# Patient Record
Sex: FEMALE | Race: ASIAN | NOT HISPANIC OR LATINO | Employment: OTHER | URBAN - NONMETROPOLITAN AREA
[De-identification: names, ages, dates, MRNs, and addresses within clinical notes are randomized per-mention and may not be internally consistent; named-entity substitution may affect disease eponyms.]

---

## 2023-12-28 PROBLEM — R25.2 LEG CRAMPS: Status: ACTIVE | Noted: 2023-12-28

## 2023-12-28 PROBLEM — R07.9 CHEST PAIN: Status: ACTIVE | Noted: 2023-12-28

## 2023-12-28 PROBLEM — R06.02 SHORTNESS OF BREATH ON EXERTION: Status: ACTIVE | Noted: 2023-12-28

## 2023-12-28 PROBLEM — M71.9 BURSITIS: Status: ACTIVE | Noted: 2023-12-28

## 2023-12-28 PROBLEM — R00.1 BRADYCARDIA: Status: ACTIVE | Noted: 2023-12-28

## 2023-12-28 PROBLEM — I44.30 AV BLOCK: Status: ACTIVE | Noted: 2023-12-28

## 2023-12-28 PROBLEM — D75.839 THROMBOCYTOSIS: Status: ACTIVE | Noted: 2023-12-28

## 2023-12-28 PROBLEM — J45.909 ASTHMA (HHS-HCC): Status: ACTIVE | Noted: 2023-12-28

## 2023-12-28 PROBLEM — I47.10 SUPRAVENTRICULAR TACHYCARDIA (CMS-HCC): Status: ACTIVE | Noted: 2023-12-28

## 2023-12-28 PROBLEM — I10 HYPERTENSION: Status: ACTIVE | Noted: 2023-12-28

## 2023-12-28 PROBLEM — E78.5 HYPERLIPIDEMIA: Status: ACTIVE | Noted: 2023-12-28

## 2023-12-28 PROBLEM — I35.0 NONRHEUMATIC AORTIC VALVE STENOSIS: Status: ACTIVE | Noted: 2023-12-28

## 2023-12-28 RX ORDER — AMLODIPINE BESYLATE 5 MG/1
1.5 TABLET ORAL DAILY
COMMUNITY

## 2023-12-28 RX ORDER — FOLIC ACID 1 MG/1
1 TABLET ORAL DAILY
COMMUNITY

## 2023-12-28 RX ORDER — LANOLIN ALCOHOL/MO/W.PET/CERES
1 CREAM (GRAM) TOPICAL DAILY
COMMUNITY

## 2023-12-28 RX ORDER — CHOLECALCIFEROL (VITAMIN D3) 125 MCG
125 CAPSULE ORAL DAILY
COMMUNITY

## 2023-12-28 RX ORDER — ASPIRIN 81 MG/1
1 TABLET ORAL DAILY
COMMUNITY

## 2023-12-28 RX ORDER — FLECAINIDE ACETATE 50 MG/1
1 TABLET ORAL EVERY 12 HOURS
COMMUNITY
Start: 2021-11-02

## 2023-12-28 RX ORDER — IRBESARTAN 150 MG/1
300 TABLET ORAL DAILY
COMMUNITY

## 2024-01-04 ENCOUNTER — OFFICE VISIT (OUTPATIENT)
Dept: CARDIOLOGY | Facility: CLINIC | Age: 89
End: 2024-01-04
Payer: MEDICARE

## 2024-01-04 VITALS
DIASTOLIC BLOOD PRESSURE: 84 MMHG | HEART RATE: 67 BPM | SYSTOLIC BLOOD PRESSURE: 144 MMHG | HEIGHT: 63 IN | BODY MASS INDEX: 23.57 KG/M2 | WEIGHT: 133 LBS

## 2024-01-04 DIAGNOSIS — R06.02 SHORTNESS OF BREATH ON EXERTION: Primary | ICD-10-CM

## 2024-01-04 DIAGNOSIS — R00.1 BRADYCARDIA: ICD-10-CM

## 2024-01-04 DIAGNOSIS — E78.2 MIXED HYPERLIPIDEMIA: ICD-10-CM

## 2024-01-04 DIAGNOSIS — I44.30 AV BLOCK: ICD-10-CM

## 2024-01-04 DIAGNOSIS — I47.10 SUPRAVENTRICULAR TACHYCARDIA (CMS-HCC): ICD-10-CM

## 2024-01-04 DIAGNOSIS — I10 PRIMARY HYPERTENSION: ICD-10-CM

## 2024-01-04 DIAGNOSIS — I35.0 NONRHEUMATIC AORTIC VALVE STENOSIS: ICD-10-CM

## 2024-01-04 PROCEDURE — 93000 ELECTROCARDIOGRAM COMPLETE: CPT | Performed by: INTERNAL MEDICINE

## 2024-01-04 PROCEDURE — 1036F TOBACCO NON-USER: CPT | Performed by: INTERNAL MEDICINE

## 2024-01-04 PROCEDURE — 99214 OFFICE O/P EST MOD 30 MIN: CPT | Performed by: INTERNAL MEDICINE

## 2024-01-04 PROCEDURE — 3077F SYST BP >= 140 MM HG: CPT | Performed by: INTERNAL MEDICINE

## 2024-01-04 PROCEDURE — 3079F DIAST BP 80-89 MM HG: CPT | Performed by: INTERNAL MEDICINE

## 2024-01-04 PROCEDURE — 1159F MED LIST DOCD IN RCRD: CPT | Performed by: INTERNAL MEDICINE

## 2024-01-04 RX ORDER — ROSUVASTATIN CALCIUM 5 MG/1
5 TABLET, COATED ORAL DAILY
Qty: 30 TABLET | Refills: 11 | Status: SHIPPED | OUTPATIENT
Start: 2024-01-04 | End: 2024-01-09 | Stop reason: SDUPTHER

## 2024-01-04 NOTE — PROGRESS NOTES
Subjective   Yancy Chau is a 89 y.o. female       Chief Complaint    Follow-up          HPI   Patient is here for follow-up continue management for history of SVT, tendency for bradycardia, aortic stenosis and because of recent evaluation for fatigue, tiredness increasing shortness of breath.  Last time I saw her I suggested to her to hold her simvastatin and I also did a pulmonary function test that showed mild to moderate diffusion abnormality.  Chest x-ray showed minimal abnormality.  The result of her diagnostic testing reviewed with her family physician Dr. Whatley over the phone.  Patient reports marked improvement since she held her simvastatin.  Her recent lab noted and reviewed with her her LDL is around 131.  Patient currently seems to be doing well.  She remains reasonably active.  She described functional class I.  She report her fatigue and tiredness has improved.  Her shortness of breath also appears to have improved.  Assessment    1. Symptoms of fatigue, tiredness and shortness of breath etiology unclear.  Recent echocardiogram showed moderate aortic stenosis with preserved LV systolic function.  Holter monitor failed to demonstrate significant arrhythmia.  Symptoms seem to be markedly improved with holding simvastatin.  Pulmonary function test showed mild to moderate diffusion abnormality.    2. Prior treatment for supraventricular tachycardia no recurrence in many years  3. Hypertension controlled  4. Hyperlipidemia LDL of 131.  She is off simvastatin  5. moderate aortic stenosis based on recent echocardiogram with recent echo noted and reviewed with him  6. Prior complaint of chest pain. Resolved. Recent stress test negative  7. Mild to moderate mitral regurgitation  8. Previous documentation of bradycardia arrhythmia improved with reducing the dose of flecainide  9.. Thrombocytosis has been followed by hematology  10. Leg cramps improved with adjustment of her potassium and the addition of  "magnesium      Plan    1. I reviewed her chest x-ray and pulmonary function test  2.  It seems like patient's symptoms has improved with holding simvastatin.  Considering she had aortic stenosis and today due to progression of her aortic valve disease I suggested trial of low-dose rosuvastatin 5 mg daily  3. I reviewed the results of her chest x-ray and pulmonary function test with family physician recently over the phone  4. We'll see her back in the office in 6 months and I advised her to notify me with changes in her functional status and symptoms   5.  Patient was asked me if she can travel to [place] and I told her cardiac status has been stable and I have no objection to that  Review of Systems   All other systems reviewed and are negative.         Visit Vitals  /84 (BP Location: Right arm, Patient Position: Sitting)   Pulse 67   Ht 1.588 m (5' 2.5\")   Wt 60.3 kg (133 lb)   BMI 23.94 kg/m²   Smoking Status Never   BSA 1.63 m²      EKG done in office today      Objective   Physical Exam  Constitutional:       Appearance: Normal appearance. She is normal weight.   HENT:      Nose: Nose normal.   Neck:      Vascular: No carotid bruit.   Cardiovascular:      Rate and Rhythm: Normal rate.      Pulses: Normal pulses.      Heart sounds: Murmur (2/6 systolic) heard.   Pulmonary:      Effort: Pulmonary effort is normal.   Abdominal:      General: Bowel sounds are normal.      Palpations: Abdomen is soft.   Genitourinary:     Rectum: Normal.   Musculoskeletal:         General: Normal range of motion.      Cervical back: Normal range of motion.      Right lower leg: No edema.      Left lower leg: No edema.   Skin:     General: Skin is warm and dry.   Neurological:      General: No focal deficit present.      Mental Status: She is alert.   Psychiatric:         Mood and Affect: Mood normal.         Behavior: Behavior normal.         Thought Content: Thought content normal.         Judgment: Judgment normal. "         Current Medications    Current Outpatient Medications:     amLODIPine (Norvasc) 5 mg tablet, Take 1.5 tablets (7.5 mg) by mouth once daily., Disp: , Rfl:     aspirin 81 mg EC tablet, Take 1 tablet (81 mg) by mouth once daily., Disp: , Rfl:     cholecalciferol (Vitamin D-3) 125 MCG (5000 UT) capsule, Take 1 capsule (125 mcg) by mouth once daily., Disp: , Rfl:     cyanocobalamin (Vitamin B-12) 1,000 mcg tablet, Take 1 tablet (1,000 mcg) by mouth once daily., Disp: , Rfl:     flecainide (Tambocor) 50 mg tablet, Take 1 tablet (50 mg) by mouth every 12 hours., Disp: , Rfl:     folic acid (Folvite) 1 mg tablet, Take 1 tablet (1 mg) by mouth once daily., Disp: , Rfl:     irbesartan (Avapro) 150 mg tablet, Take 2 tablets (300 mg) by mouth once daily., Disp: , Rfl:     rosuvastatin (Crestor) 5 mg tablet, Take 1 tablet (5 mg) by mouth once daily., Disp: 30 tablet, Rfl: 11                     Assessment/Plan   1. Shortness of breath on exertion        2. AV block        3. Bradycardia        4. Mixed hyperlipidemia  rosuvastatin (Crestor) 5 mg tablet    Lipid Panel    Alanine Aminotransferase    Aspartate Aminotransferase    Lipid Panel    Alanine Aminotransferase    Aspartate Aminotransferase      5. Primary hypertension  Basic Metabolic Panel    Basic Metabolic Panel      6. Nonrheumatic aortic valve stenosis  rosuvastatin (Crestor) 5 mg tablet    Follow Up In Cardiology      7. Supraventricular tachycardia           Scribe Attestation  By signing my name below, Kym VU LPN   , Scribe   attest that this documentation has been prepared under the direction and in the presence of Ra Harrell MD.

## 2024-01-04 NOTE — LETTER
January 4, 2024     Raúl Dahl DO  [address]  [place] [address]    Patient: Yancy Chau   YOB: 1934   Date of Visit: 1/4/2024       Dear Dr. Raúl Dahl DO:    Thank you for referring Yancy Chau to me for evaluation. Below are my notes for this consultation.  If you have questions, please do not hesitate to call me. I look forward to following your patient along with you.       Sincerely,     Ra Harrell MD      CC: No Recipients  ______________________________________________________________________________________    Subjective   Yancy Chau is a 89 y.o. female       Chief Complaint    Follow-up          HPI   Patient is here for follow-up continue management for history of SVT, tendency for bradycardia, aortic stenosis and because of recent evaluation for fatigue, tiredness increasing shortness of breath.  Last time I saw her I suggested to her to hold her simvastatin and I also did a pulmonary function test that showed mild to moderate diffusion abnormality.  Chest x-ray showed minimal abnormality.  The result of her diagnostic testing reviewed with her family physician Dr. Whatley over the phone.  Patient reports marked improvement since she held her simvastatin.  Her recent lab noted and reviewed with her her LDL is around 131.  Patient currently seems to be doing well.  She remains reasonably active.  She described functional class I.  She report her fatigue and tiredness has improved.  Her shortness of breath also appears to have improved.  Assessment    1. Symptoms of fatigue, tiredness and shortness of breath etiology unclear.  Recent echocardiogram showed moderate aortic stenosis with preserved LV systolic function.  Holter monitor failed to demonstrate significant arrhythmia.  Symptoms seem to be markedly improved with holding simvastatin.  Pulmonary function test showed mild to moderate diffusion abnormality.    2. Prior treatment for  "supraventricular tachycardia no recurrence in many years  3. Hypertension controlled  4. Hyperlipidemia LDL of 131.  She is off simvastatin  5. moderate aortic stenosis based on recent echocardiogram with recent echo noted and reviewed with him  6. Prior complaint of chest pain. Resolved. Recent stress test negative  7. Mild to moderate mitral regurgitation  8. Previous documentation of bradycardia arrhythmia improved with reducing the dose of flecainide  9.. Thrombocytosis has been followed by hematology  10. Leg cramps improved with adjustment of her potassium and the addition of magnesium      Plan    1. I reviewed her chest x-ray and pulmonary function test  2.  It seems like patient's symptoms has improved with holding simvastatin.  Considering she had aortic stenosis and today due to progression of her aortic valve disease I suggested trial of low-dose rosuvastatin 5 mg daily  3. I reviewed the results of her chest x-ray and pulmonary function test with family physician recently over the phone  4. We'll see her back in the office in 6 months and I advised her to notify me with changes in her functional status and symptoms   5.  Patient was asked me if she can travel to [place] and I told her cardiac status has been stable and I have no objection to that  Review of Systems   All other systems reviewed and are negative.         Visit Vitals  /84 (BP Location: Right arm, Patient Position: Sitting)   Pulse 67   Ht 1.588 m (5' 2.5\")   Wt 60.3 kg (133 lb)   BMI 23.94 kg/m²   Smoking Status Never   BSA 1.63 m²      EKG done in office today      Objective   Physical Exam  Constitutional:       Appearance: Normal appearance. She is normal weight.   HENT:      Nose: Nose normal.   Neck:      Vascular: No carotid bruit.   Cardiovascular:      Rate and Rhythm: Normal rate.      Pulses: Normal pulses.      Heart sounds: Murmur (2/6 systolic) heard.   Pulmonary:      Effort: Pulmonary effort is normal.   Abdominal:      " General: Bowel sounds are normal.      Palpations: Abdomen is soft.   Genitourinary:     Rectum: Normal.   Musculoskeletal:         General: Normal range of motion.      Cervical back: Normal range of motion.      Right lower leg: No edema.      Left lower leg: No edema.   Skin:     General: Skin is warm and dry.   Neurological:      General: No focal deficit present.      Mental Status: She is alert.   Psychiatric:         Mood and Affect: Mood normal.         Behavior: Behavior normal.         Thought Content: Thought content normal.         Judgment: Judgment normal.         Current Medications    Current Outpatient Medications:   •  amLODIPine (Norvasc) 5 mg tablet, Take 1.5 tablets (7.5 mg) by mouth once daily., Disp: , Rfl:   •  aspirin 81 mg EC tablet, Take 1 tablet (81 mg) by mouth once daily., Disp: , Rfl:   •  cholecalciferol (Vitamin D-3) 125 MCG (5000 UT) capsule, Take 1 capsule (125 mcg) by mouth once daily., Disp: , Rfl:   •  cyanocobalamin (Vitamin B-12) 1,000 mcg tablet, Take 1 tablet (1,000 mcg) by mouth once daily., Disp: , Rfl:   •  flecainide (Tambocor) 50 mg tablet, Take 1 tablet (50 mg) by mouth every 12 hours., Disp: , Rfl:   •  folic acid (Folvite) 1 mg tablet, Take 1 tablet (1 mg) by mouth once daily., Disp: , Rfl:   •  irbesartan (Avapro) 150 mg tablet, Take 2 tablets (300 mg) by mouth once daily., Disp: , Rfl:   •  rosuvastatin (Crestor) 5 mg tablet, Take 1 tablet (5 mg) by mouth once daily., Disp: 30 tablet, Rfl: 11                     Assessment/Plan   1. Shortness of breath on exertion        2. AV block        3. Bradycardia        4. Mixed hyperlipidemia  rosuvastatin (Crestor) 5 mg tablet    Lipid Panel    Alanine Aminotransferase    Aspartate Aminotransferase    Lipid Panel    Alanine Aminotransferase    Aspartate Aminotransferase      5. Primary hypertension  Basic Metabolic Panel    Basic Metabolic Panel      6. Nonrheumatic aortic valve stenosis  rosuvastatin (Crestor) 5 mg  tablet    Follow Up In Cardiology      7. Supraventricular tachycardia           Scribe Attestation  By signing my name below, IKym LPN   , Scribe   attest that this documentation has been prepared under the direction and in the presence of Ra Harrell MD.

## 2024-01-05 DIAGNOSIS — I35.0 NONRHEUMATIC AORTIC VALVE STENOSIS: ICD-10-CM

## 2024-01-05 DIAGNOSIS — E78.2 MIXED HYPERLIPIDEMIA: ICD-10-CM

## 2024-01-09 RX ORDER — ROSUVASTATIN CALCIUM 5 MG/1
5 TABLET, COATED ORAL DAILY
Qty: 90 TABLET | Refills: 3 | Status: SHIPPED | OUTPATIENT
Start: 2024-01-09

## 2024-07-10 ENCOUNTER — APPOINTMENT (OUTPATIENT)
Dept: CARDIOLOGY | Facility: CLINIC | Age: 89
End: 2024-07-10
Payer: MEDICARE

## 2024-07-18 ENCOUNTER — APPOINTMENT (OUTPATIENT)
Dept: CARDIOLOGY | Facility: CLINIC | Age: 89
End: 2024-07-18
Payer: MEDICARE

## 2024-07-18 VITALS
HEART RATE: 70 BPM | DIASTOLIC BLOOD PRESSURE: 80 MMHG | SYSTOLIC BLOOD PRESSURE: 124 MMHG | BODY MASS INDEX: 23.7 KG/M2 | HEIGHT: 62 IN | WEIGHT: 128.8 LBS

## 2024-07-18 DIAGNOSIS — Z78.9 NEVER SMOKED CIGARETTES: ICD-10-CM

## 2024-07-18 DIAGNOSIS — E78.2 MIXED HYPERLIPIDEMIA: ICD-10-CM

## 2024-07-18 DIAGNOSIS — R07.9 CHEST PAIN, UNSPECIFIED TYPE: ICD-10-CM

## 2024-07-18 DIAGNOSIS — I10 PRIMARY HYPERTENSION: ICD-10-CM

## 2024-07-18 DIAGNOSIS — I35.0 NONRHEUMATIC AORTIC VALVE STENOSIS: ICD-10-CM

## 2024-07-18 DIAGNOSIS — R06.02 SHORTNESS OF BREATH ON EXERTION: Primary | ICD-10-CM

## 2024-07-18 DIAGNOSIS — R00.1 BRADYCARDIA: ICD-10-CM

## 2024-07-18 DIAGNOSIS — I47.10 SUPRAVENTRICULAR TACHYCARDIA (CMS-HCC): ICD-10-CM

## 2024-07-18 PROCEDURE — 99214 OFFICE O/P EST MOD 30 MIN: CPT | Performed by: INTERNAL MEDICINE

## 2024-07-18 PROCEDURE — 3074F SYST BP LT 130 MM HG: CPT | Performed by: INTERNAL MEDICINE

## 2024-07-18 PROCEDURE — 1160F RVW MEDS BY RX/DR IN RCRD: CPT | Performed by: INTERNAL MEDICINE

## 2024-07-18 PROCEDURE — 1036F TOBACCO NON-USER: CPT | Performed by: INTERNAL MEDICINE

## 2024-07-18 PROCEDURE — 93000 ELECTROCARDIOGRAM COMPLETE: CPT | Performed by: INTERNAL MEDICINE

## 2024-07-18 PROCEDURE — 3079F DIAST BP 80-89 MM HG: CPT | Performed by: INTERNAL MEDICINE

## 2024-07-18 PROCEDURE — 1159F MED LIST DOCD IN RCRD: CPT | Performed by: INTERNAL MEDICINE

## 2024-07-18 RX ORDER — ROSUVASTATIN CALCIUM 5 MG/1
5 TABLET, COATED ORAL DAILY
COMMUNITY

## 2024-07-18 RX ORDER — SIMVASTATIN 10 MG/1
10 TABLET, FILM COATED ORAL EVERY OTHER DAY
COMMUNITY
End: 2024-07-18 | Stop reason: WASHOUT

## 2024-07-18 ASSESSMENT — ENCOUNTER SYMPTOMS: SHORTNESS OF BREATH: 1

## 2024-07-18 NOTE — LETTER
July 18, 2024     Raúl Dahl DO  [address]  [place] [address]    Patient: Yancy Chau   YOB: 1934   Date of Visit: 7/18/2024       Dear Dr. Raúl Dahl DO:    Thank you for referring Yancy Chau to me for evaluation. Below are my notes for this consultation.  If you have questions, please do not hesitate to call me. I look forward to following your patient along with you.       Sincerely,     Ra Harrell MD      CC: No Recipients  ______________________________________________________________________________________    Subjective   Yancy Chau is a 89 y.o. female       Chief Complaint    Follow-up          HPI        Patient is here for follow-up continue management for history of SVT, previous evaluation fatigue and tiredness, aortic stenosis.  Since last time I saw her check she reports is feeling quite well.  She denies complaint of chest pain, palpitation, lightheadedness, dizziness or syncope.  She indicated to me that she traveled to [place] during the winter and did very well.  She described functional class I.  She had no active cardiac complaint.  Her EKG showed sinus rhythm.    Assessment     1.  Previous complaint of fatigue, tiredness and shortness of breath etiology unclear.  Recent echocardiogram showed moderate aortic stenosis with preserved LV systolic function.  Holter monitor failed to demonstrate significant arrhythmia.  Symptoms seem to be markedly improved with holding simvastatin.  Pulmonary function test showed mild to moderate diffusion abnormality.  But overall she report her symptoms markedly improved.  She has been able to tolerate rosuvastatin  2. Prior treatment for supraventricular tachycardia no recurrence in many years  3. Hypertension controlled  4. Hyperlipidemia able to tolerate rosuvastatin better than simvastatin  5. moderate aortic stenosis based on recent echocardiogram with recent echo noted and reviewed with  "him  6. Prior complaint of chest pain. Resolved. Recent stress test negative  7. Mild to moderate mitral regurgitation  8. Previous documentation of bradycardia arrhythmia improved with reducing the dose of flecainide  9.. Thrombocytosis has been followed by hematology  10. Leg cramps improved with adjustment of her potassium and the addition of magnesium        Plan     1. I advised the patient to continue present medical regimen  2.  I reviewed with her her recent lab work  3. I advised her to notify of change in cardiac status or symptoms  4. We'll see her back in the office in 6 months and I advised her to notify me with changes in her functional status and symptoms     Review of Systems   Respiratory:  Positive for shortness of breath.    All other systems reviewed and are negative.           Vitals:    07/18/24 1317   BP: 124/80   BP Location: Left arm   Patient Position: Sitting   Pulse: 70   Weight: 58.4 kg (128 lb 12.8 oz)   Height: 1.575 m (5' 2\")    EKG done in office today      Objective   Physical Exam  Constitutional:       Appearance: Normal appearance.   HENT:      Nose: Nose normal.   Neck:      Vascular: No carotid bruit.   Cardiovascular:      Rate and Rhythm: Normal rate.      Pulses: Normal pulses.      Heart sounds: Murmur heard.      Systolic murmur is present with a grade of 2/6.   Pulmonary:      Effort: Pulmonary effort is normal.   Abdominal:      General: Bowel sounds are normal.      Palpations: Abdomen is soft.   Musculoskeletal:         General: Normal range of motion.      Cervical back: Normal range of motion.      Right lower leg: No edema.      Left lower leg: No edema.   Skin:     General: Skin is warm and dry.   Neurological:      General: No focal deficit present.      Mental Status: She is alert.   Psychiatric:         Mood and Affect: Mood normal.         Behavior: Behavior normal.         Thought Content: Thought content normal.         Judgment: Judgment normal. "         Allergies  Simvastatin     Current Medications    Current Outpatient Medications:   •  amLODIPine (Norvasc) 5 mg tablet, Take 1.5 tablets (7.5 mg) by mouth once daily., Disp: , Rfl:   •  aspirin 81 mg EC tablet, Take 1 tablet (81 mg) by mouth once daily., Disp: , Rfl:   •  cholecalciferol (Vitamin D-3) 125 MCG (5000 UT) capsule, Take 1 capsule (125 mcg) by mouth once daily., Disp: , Rfl:   •  cyanocobalamin (Vitamin B-12) 1,000 mcg tablet, Take 1 tablet (1,000 mcg) by mouth once daily., Disp: , Rfl:   •  flecainide (Tambocor) 50 mg tablet, Take 1 tablet (50 mg) by mouth every 12 hours., Disp: , Rfl:   •  irbesartan (Avapro) 150 mg tablet, Take 2 tablets (300 mg) by mouth once daily., Disp: , Rfl:   •  rosuvastatin (Crestor) 5 mg tablet, Take 1 tablet (5 mg) by mouth once daily., Disp: , Rfl:                      Assessment/Plan   1. Shortness of breath on exertion        2. Nonrheumatic aortic valve stenosis  Follow Up In Cardiology    Follow Up In Cardiology    ECG 12 Lead      3. Supraventricular tachycardia (CMS-HCC)        4. Bradycardia        5. Chest pain, unspecified type        6. Mixed hyperlipidemia        7. Primary hypertension        8. BMI 23.0-23.9, adult        9. Never smoked cigarettes                 Scribe Attestation  By signing my name below, I, Sheree ODEN LPN , Neibe   attest that this documentation has been prepared under the direction and in the presence of Ra Harrell MD.     Provider Attestation - Scribe documentation    All medical record entries made by the Scribe were at my direction and personally dictated by me. I have reviewed the chart and agree that the record accurately reflects my personal performance of the history, physical exam, discussion and plan.

## 2024-07-18 NOTE — PROGRESS NOTES
Subjective   Yancy Chau is a 89 y.o. female       Chief Complaint    Follow-up          HPI        Patient is here for follow-up continue management for history of SVT, previous evaluation fatigue and tiredness, aortic stenosis.  Since last time I saw her check she reports is feeling quite well.  She denies complaint of chest pain, palpitation, lightheadedness, dizziness or syncope.  She indicated to me that she traveled to [place] during the winter and did very well.  She described functional class I.  She had no active cardiac complaint.  Her EKG showed sinus rhythm.    Assessment     1.  Previous complaint of fatigue, tiredness and shortness of breath etiology unclear.  Recent echocardiogram showed moderate aortic stenosis with preserved LV systolic function.  Holter monitor failed to demonstrate significant arrhythmia.  Symptoms seem to be markedly improved with holding simvastatin.  Pulmonary function test showed mild to moderate diffusion abnormality.  But overall she report her symptoms markedly improved.  She has been able to tolerate rosuvastatin  2. Prior treatment for supraventricular tachycardia no recurrence in many years  3. Hypertension controlled  4. Hyperlipidemia able to tolerate rosuvastatin better than simvastatin  5. moderate aortic stenosis based on recent echocardiogram with recent echo noted and reviewed with him  6. Prior complaint of chest pain. Resolved. Recent stress test negative  7. Mild to moderate mitral regurgitation  8. Previous documentation of bradycardia arrhythmia improved with reducing the dose of flecainide  9.. Thrombocytosis has been followed by hematology  10. Leg cramps improved with adjustment of her potassium and the addition of magnesium        Plan     1. I advised the patient to continue present medical regimen  2.  I reviewed with her her recent lab work  3. I advised her to notify of change in cardiac status or symptoms  4. We'll see her back in the office in 6  "months and I advised her to notify me with changes in her functional status and symptoms     Review of Systems   Respiratory:  Positive for shortness of breath.    All other systems reviewed and are negative.           Vitals:    07/18/24 1317   BP: 124/80   BP Location: Left arm   Patient Position: Sitting   Pulse: 70   Weight: 58.4 kg (128 lb 12.8 oz)   Height: 1.575 m (5' 2\")    EKG done in office today      Objective   Physical Exam  Constitutional:       Appearance: Normal appearance.   HENT:      Nose: Nose normal.   Neck:      Vascular: No carotid bruit.   Cardiovascular:      Rate and Rhythm: Normal rate.      Pulses: Normal pulses.      Heart sounds: Murmur heard.      Systolic murmur is present with a grade of 2/6.   Pulmonary:      Effort: Pulmonary effort is normal.   Abdominal:      General: Bowel sounds are normal.      Palpations: Abdomen is soft.   Musculoskeletal:         General: Normal range of motion.      Cervical back: Normal range of motion.      Right lower leg: No edema.      Left lower leg: No edema.   Skin:     General: Skin is warm and dry.   Neurological:      General: No focal deficit present.      Mental Status: She is alert.   Psychiatric:         Mood and Affect: Mood normal.         Behavior: Behavior normal.         Thought Content: Thought content normal.         Judgment: Judgment normal.         Allergies  Simvastatin     Current Medications    Current Outpatient Medications:     amLODIPine (Norvasc) 5 mg tablet, Take 1.5 tablets (7.5 mg) by mouth once daily., Disp: , Rfl:     aspirin 81 mg EC tablet, Take 1 tablet (81 mg) by mouth once daily., Disp: , Rfl:     cholecalciferol (Vitamin D-3) 125 MCG (5000 UT) capsule, Take 1 capsule (125 mcg) by mouth once daily., Disp: , Rfl:     cyanocobalamin (Vitamin B-12) 1,000 mcg tablet, Take 1 tablet (1,000 mcg) by mouth once daily., Disp: , Rfl:     flecainide (Tambocor) 50 mg tablet, Take 1 tablet (50 mg) by mouth every 12 hours., Disp: " , Rfl:     irbesartan (Avapro) 150 mg tablet, Take 2 tablets (300 mg) by mouth once daily., Disp: , Rfl:     rosuvastatin (Crestor) 5 mg tablet, Take 1 tablet (5 mg) by mouth once daily., Disp: , Rfl:                      Assessment/Plan   1. Shortness of breath on exertion        2. Nonrheumatic aortic valve stenosis  Follow Up In Cardiology    Follow Up In Cardiology    ECG 12 Lead      3. Supraventricular tachycardia (CMS-HCC)        4. Bradycardia        5. Chest pain, unspecified type        6. Mixed hyperlipidemia        7. Primary hypertension        8. BMI 23.0-23.9, adult        9. Never smoked cigarettes                 Scribe Attestation  By signing my name below, I, Skyla Mckeon LPN   attest that this documentation has been prepared under the direction and in the presence of Ra Harrell MD.     Provider Attestation - Scribe documentation    All medical record entries made by the Scribe were at my direction and personally dictated by me. I have reviewed the chart and agree that the record accurately reflects my personal performance of the history, physical exam, discussion and plan.

## 2025-01-30 ENCOUNTER — APPOINTMENT (OUTPATIENT)
Dept: CARDIOLOGY | Facility: CLINIC | Age: OVER 89
End: 2025-01-30
Payer: MEDICARE

## 2025-01-30 VITALS
WEIGHT: 134 LBS | DIASTOLIC BLOOD PRESSURE: 88 MMHG | BODY MASS INDEX: 24.66 KG/M2 | HEART RATE: 68 BPM | HEIGHT: 62 IN | SYSTOLIC BLOOD PRESSURE: 137 MMHG

## 2025-01-30 DIAGNOSIS — I35.0 NONRHEUMATIC AORTIC VALVE STENOSIS: ICD-10-CM

## 2025-01-30 DIAGNOSIS — R06.02 SHORTNESS OF BREATH ON EXERTION: Primary | ICD-10-CM

## 2025-01-30 DIAGNOSIS — I10 PRIMARY HYPERTENSION: ICD-10-CM

## 2025-01-30 DIAGNOSIS — I44.30 AV BLOCK: ICD-10-CM

## 2025-01-30 DIAGNOSIS — E78.2 MIXED HYPERLIPIDEMIA: ICD-10-CM

## 2025-01-30 DIAGNOSIS — R00.1 BRADYCARDIA: ICD-10-CM

## 2025-01-30 DIAGNOSIS — Z78.9 NEVER SMOKED CIGARETTES: ICD-10-CM

## 2025-01-30 DIAGNOSIS — I47.10 SUPRAVENTRICULAR TACHYCARDIA (CMS-HCC): ICD-10-CM

## 2025-01-30 PROCEDURE — 99214 OFFICE O/P EST MOD 30 MIN: CPT | Performed by: INTERNAL MEDICINE

## 2025-01-30 PROCEDURE — 3075F SYST BP GE 130 - 139MM HG: CPT | Performed by: INTERNAL MEDICINE

## 2025-01-30 PROCEDURE — 1159F MED LIST DOCD IN RCRD: CPT | Performed by: INTERNAL MEDICINE

## 2025-01-30 PROCEDURE — 3079F DIAST BP 80-89 MM HG: CPT | Performed by: INTERNAL MEDICINE

## 2025-01-30 PROCEDURE — 1036F TOBACCO NON-USER: CPT | Performed by: INTERNAL MEDICINE

## 2025-01-30 PROCEDURE — 93000 ELECTROCARDIOGRAM COMPLETE: CPT | Performed by: INTERNAL MEDICINE

## 2025-01-30 PROCEDURE — 3077F SYST BP >= 140 MM HG: CPT | Performed by: INTERNAL MEDICINE

## 2025-01-30 PROCEDURE — 1160F RVW MEDS BY RX/DR IN RCRD: CPT | Performed by: INTERNAL MEDICINE

## 2025-01-30 NOTE — LETTER
January 30, 2025     Raúl Dahl DO  [address]  [place] [address]    Patient: Yancy Chau   YOB: 1934   Date of Visit: 1/30/2025       Dear Dr. Raúl Dahl DO:    Thank you for referring Yancy Chau to me for evaluation. Below are my notes for this consultation.  If you have questions, please do not hesitate to call me. I look forward to following your patient along with you.       Sincerely,     Ra Harrell MD      CC: No Recipients  ______________________________________________________________________________________    Subjective   Yancy Chau is a 89 y.o. female       Chief Complaint    Follow-up          HPI        Patient is here for follow-up for management for previous evaluation for fatigue tiredness and shortness of breath which she report marked improvement, hypertension, hyperlipidemia and aortic stenosis.  Since last time I saw her she remains reasonably active.  She is 89-year-old.  She report improvement of her symptoms.  She denies lightheadedness, dizziness or syncope.  She remains fairly active.    Assessment     1.  Previous complaint of fatigue, tiredness and shortness of breath etiology unclear.  Recent echocardiogram showed moderate aortic stenosis with preserved LV systolic function.  Holter monitor failed to demonstrate significant arrhythmia.  Symptoms seem to be markedly improved with holding simvastatin.  Pulmonary function test showed mild to moderate diffusion abnormality.  But overall she report her symptoms markedly improved.  She has been able to tolerate rosuvastatin  2. Prior treatment for supraventricular tachycardia no recurrence in many years  3. Hypertension controlled  4. Hyperlipidemia able to tolerate rosuvastatin better than simvastatin  5. moderate aortic stenosis based on last echocardiogram with recent echo noted and reviewed with him  6. Prior complaint of chest pain. Resolved.  Previous stress test  "negative  7. Mild to moderate mitral regurgitation  8. Previous documentation of bradycardia arrhythmia improved with reducing the dose of flecainide  9.. Thrombocytosis has been followed by hematology  10. Leg cramps improved with adjustment of her potassium and the addition of magnesium        Plan     1. I advised the patient to continue present medical regimen  2.  I reviewed with her her recent lab work  3. I advised her to notify of change in cardiac status or symptoms  4. We'll see her back in the office in 6 months and I advised her to notify me with changes in her functional status and symptoms  5.  Will repeat her echocardiogram prior to next office visit     Review of Systems   All other systems reviewed and are negative.           Vitals:    01/30/25 1327   BP: 154/80   BP Location: Left arm   Patient Position: Sitting   Pulse: 68   Weight: 60.8 kg (134 lb)   Height: 1.575 m (5' 2\")      EKG done in office today      Objective   Physical Exam  Constitutional:       Appearance: Normal appearance.   HENT:      Nose: Nose normal.   Neck:      Vascular: No carotid bruit.   Cardiovascular:      Rate and Rhythm: Normal rate.      Pulses: Normal pulses.      Heart sounds: Normal heart sounds.   Pulmonary:      Effort: Pulmonary effort is normal.   Abdominal:      General: Bowel sounds are normal.      Palpations: Abdomen is soft.   Musculoskeletal:         General: Normal range of motion.      Cervical back: Normal range of motion.      Right lower leg: No edema.      Left lower leg: No edema.   Skin:     General: Skin is warm and dry.   Neurological:      General: No focal deficit present.      Mental Status: She is alert.   Psychiatric:         Mood and Affect: Mood normal.         Behavior: Behavior normal.         Thought Content: Thought content normal.         Judgment: Judgment normal.         Allergies  Simvastatin     Current Medications    Current Outpatient Medications:   •  amLODIPine (Norvasc) 5 mg " tablet, Take 1.5 tablets (7.5 mg) by mouth once daily., Disp: , Rfl:   •  aspirin 81 mg EC tablet, Take 1 tablet (81 mg) by mouth once daily., Disp: , Rfl:   •  cholecalciferol (Vitamin D-3) 125 MCG (5000 UT) capsule, Take 1 capsule (125 mcg) by mouth once daily., Disp: , Rfl:   •  cyanocobalamin (Vitamin B-12) 1,000 mcg tablet, Take 1 tablet (1,000 mcg) by mouth once daily., Disp: , Rfl:   •  flecainide (Tambocor) 50 mg tablet, Take 1 tablet (50 mg) by mouth every 12 hours., Disp: , Rfl:   •  irbesartan (Avapro) 150 mg tablet, Take 2 tablets (300 mg) by mouth once daily., Disp: , Rfl:   •  rosuvastatin (Crestor) 5 mg tablet, Take 1 tablet (5 mg) by mouth once daily., Disp: , Rfl:                      Assessment/Plan   1. Shortness of breath on exertion        2. Nonrheumatic aortic valve stenosis  Follow Up In Cardiology    Follow Up In Cardiology    Transthoracic Echo Complete      3. Supraventricular tachycardia (CMS-HCC)        4. Primary hypertension  Transthoracic Echo Complete      5. Mixed hyperlipidemia        6. Bradycardia  ECG 12 Lead      7. AV block  ECG 12 Lead      8. Never smoked cigarettes                 Scribe Attestation  By signing my name below, Gillian VU LPN, Scribe   attest that this documentation has been prepared under the direction and in the presence of Ra Harrell MD.     Provider Attestation - Scribe documentation    All medical record entries made by the Scribe were at my direction and personally dictated by me. I have reviewed the chart and agree that the record accurately reflects my personal performance of the history, physical exam, discussion and plan.

## 2025-01-30 NOTE — PATIENT INSTRUCTIONS
Please bring all medicines, vitamins, and herbal supplements with you when you come to the office.    Prescriptions will not be filled unless you are compliant with your follow up appointments or have a follow up appointment scheduled as per instruction of your physician. Refills should be requested at the time of your visit.     Fall Prevention Education Given     Reduce salt intake  Echo   6 months  Follow up

## 2025-01-30 NOTE — PROGRESS NOTES
Subjective   Yancy Chua is a 89 y.o. female       Chief Complaint    Follow-up          HPI        Patient is here for follow-up for management for previous evaluation for fatigue tiredness and shortness of breath which she report marked improvement, hypertension, hyperlipidemia and aortic stenosis.  Since last time I saw her she remains reasonably active.  She is 89-year-old.  She report improvement of her symptoms.  She denies lightheadedness, dizziness or syncope.  She remains fairly active.    Assessment     1.  Previous complaint of fatigue, tiredness and shortness of breath etiology unclear.  Recent echocardiogram showed moderate aortic stenosis with preserved LV systolic function.  Holter monitor failed to demonstrate significant arrhythmia.  Symptoms seem to be markedly improved with holding simvastatin.  Pulmonary function test showed mild to moderate diffusion abnormality.  But overall she report her symptoms markedly improved.  She has been able to tolerate rosuvastatin  2. Prior treatment for supraventricular tachycardia no recurrence in many years  3. Hypertension seem to be running slightly high.   4. Hyperlipidemia able to tolerate rosuvastatin better than simvastatin  5. moderate aortic stenosis based on last echocardiogram with recent echo noted and reviewed with him  6. Prior complaint of chest pain. Resolved.  Previous stress test negative  7. Mild to moderate mitral regurgitation  8. Previous documentation of bradycardia arrhythmia improved with reducing the dose of flecainide  9.. Thrombocytosis has been followed by hematology  10. Leg cramps improved with adjustment of her potassium and the addition of magnesium        Plan     1. I advised the patient to continue present medical regimen  2.  I reviewed with her her recent lab work  3. I advised her to notify of change in cardiac status or symptoms  4. We'll see her back in the office in 6 months and I advised her to notify me with changes  "in her functional status and symptoms  5.  Will repeat her echocardiogram prior to next office visit  6.  I discussed with her increasing amlodipine.  She declined she want to try with diet.  She admits to high salt intake as she consume a lot of soy sauce.  I told her to continue to monitor blood pressure if remains elevated to notify me     Review of Systems   All other systems reviewed and are negative.           Vitals:    01/30/25 1327   BP: 154/80   BP Location: Left arm   Patient Position: Sitting   Pulse: 68   Weight: 60.8 kg (134 lb)   Height: 1.575 m (5' 2\")      EKG done in office today      Objective   Physical Exam  Constitutional:       Appearance: Normal appearance.   HENT:      Nose: Nose normal.   Neck:      Vascular: No carotid bruit.   Cardiovascular:      Rate and Rhythm: Normal rate.      Pulses: Normal pulses.      Heart sounds: Normal heart sounds.   Pulmonary:      Effort: Pulmonary effort is normal.   Abdominal:      General: Bowel sounds are normal.      Palpations: Abdomen is soft.   Musculoskeletal:         General: Normal range of motion.      Cervical back: Normal range of motion.      Right lower leg: No edema.      Left lower leg: No edema.   Skin:     General: Skin is warm and dry.   Neurological:      General: No focal deficit present.      Mental Status: She is alert.   Psychiatric:         Mood and Affect: Mood normal.         Behavior: Behavior normal.         Thought Content: Thought content normal.         Judgment: Judgment normal.         Allergies  Simvastatin     Current Medications    Current Outpatient Medications:     amLODIPine (Norvasc) 5 mg tablet, Take 1.5 tablets (7.5 mg) by mouth once daily., Disp: , Rfl:     aspirin 81 mg EC tablet, Take 1 tablet (81 mg) by mouth once daily., Disp: , Rfl:     cholecalciferol (Vitamin D-3) 125 MCG (5000 UT) capsule, Take 1 capsule (125 mcg) by mouth once daily., Disp: , Rfl:     cyanocobalamin (Vitamin B-12) 1,000 mcg tablet, Take " 1 tablet (1,000 mcg) by mouth once daily., Disp: , Rfl:     flecainide (Tambocor) 50 mg tablet, Take 1 tablet (50 mg) by mouth every 12 hours., Disp: , Rfl:     irbesartan (Avapro) 150 mg tablet, Take 2 tablets (300 mg) by mouth once daily., Disp: , Rfl:     rosuvastatin (Crestor) 5 mg tablet, Take 1 tablet (5 mg) by mouth once daily., Disp: , Rfl:                      Assessment/Plan   1. Shortness of breath on exertion        2. Nonrheumatic aortic valve stenosis  Follow Up In Cardiology    Follow Up In Cardiology    Transthoracic Echo Complete      3. Supraventricular tachycardia (CMS-HCC)        4. Primary hypertension  Transthoracic Echo Complete      5. Mixed hyperlipidemia        6. Bradycardia  ECG 12 Lead      7. AV block  ECG 12 Lead      8. Never smoked cigarettes                 Scribe Attestation  By signing my name below, Gillian VU LPN, Scribe   attest that this documentation has been prepared under the direction and in the presence of Ra Harrell MD.     Provider Attestation - Scribe documentation    All medical record entries made by the Scribe were at my direction and personally dictated by me. I have reviewed the chart and agree that the record accurately reflects my personal performance of the history, physical exam, discussion and plan.

## 2025-07-30 ENCOUNTER — HOSPITAL ENCOUNTER (OUTPATIENT)
Dept: CARDIOLOGY | Facility: CLINIC | Age: OVER 89
Discharge: HOME | End: 2025-07-30
Payer: MEDICARE

## 2025-07-30 VITALS
BODY MASS INDEX: 24.66 KG/M2 | DIASTOLIC BLOOD PRESSURE: 86 MMHG | HEIGHT: 62 IN | WEIGHT: 134 LBS | SYSTOLIC BLOOD PRESSURE: 140 MMHG

## 2025-07-30 DIAGNOSIS — I35.0 NONRHEUMATIC AORTIC VALVE STENOSIS: ICD-10-CM

## 2025-07-30 DIAGNOSIS — I10 PRIMARY HYPERTENSION: ICD-10-CM

## 2025-07-30 LAB
AORTIC VALVE MEAN GRADIENT: 45 MMHG
AORTIC VALVE PEAK VELOCITY: 4.03 M/S
AV PEAK GRADIENT: 65 MMHG
EJECTION FRACTION APICAL 4 CHAMBER: 64
EJECTION FRACTION: 73 %
LEFT ATRIUM VOLUME AREA LENGTH INDEX BSA: 51.5 ML/M2
LEFT VENTRICLE INTERNAL DIMENSION DIASTOLE: 4.3 CM (ref 3.5–6)
LEFT VENTRICULAR OUTFLOW TRACT DIAMETER: 2.4 CM
LV EJECTION FRACTION BIPLANE: 59 %
MITRAL VALVE E/A RATIO: 0.66
MITRAL VALVE E/E' RATIO: 12.81
RIGHT VENTRICLE FREE WALL PEAK S': 11.98 CM/S
RIGHT VENTRICLE PEAK SYSTOLIC PRESSURE: 31 MMHG
TRICUSPID ANNULAR PLANE SYSTOLIC EXCURSION: 1.7 CM

## 2025-07-30 PROCEDURE — 93306 TTE W/DOPPLER COMPLETE: CPT | Performed by: INTERNAL MEDICINE

## 2025-07-30 PROCEDURE — 93306 TTE W/DOPPLER COMPLETE: CPT

## 2025-08-01 ENCOUNTER — RESULTS FOLLOW-UP (OUTPATIENT)
Dept: CARDIOLOGY | Facility: CLINIC | Age: OVER 89
End: 2025-08-01
Payer: MEDICARE

## 2025-08-01 NOTE — TELEPHONE ENCOUNTER
Result Communication    Resulted Orders   Transthoracic Echo Complete   Result Value Ref Range    AV mn grad 45 mmHg    AV pk beni 4.03 m/s    LV Biplane EF 59 %    LVOT diam 2.40 cm    MV E/A ratio 0.66     Tricuspid annular plane systolic excursion 1.7 cm    MV avg E/e' ratio 12.81     LA vol index A/L 51.5 ml/m2    LV EF 73 %    RV free wall pk S' 11.98 cm/s    RVSP 31 mmHg    LVIDd 4.30 cm    AV pk grad 65 mmHg    LV A4C EF 64.0     Narrative                   [address], [address] [address]          Tel [phone] Fax [phone]    TRANSTHORACIC ECHOCARDIOGRAM REPORT    Patient Name:       LOL[facility] Blood Physician:    [id number] Harrell MD  Study Date:         7/30/2025           Ordering Provider:    [id number] HARRELL  MRN/PID:            [id number]            Fellow:  Accession#:         [id number]        Nurse:  Date of Birth/Age:  12/10/1934 / 89     Sonographer:          Suyapa burger                                     RDCS, RVT  Gender Assigned at  F                   Additional Staff:  Birth:  Height:             157.48 cm           Admit Date:  Weight:             60.78 kg            Admission Status:     Outpatient  BSA / BMI:          1.61 m2 / 24.51     Department Location:  [facility]                      kg/m2                                     [place]  Blood Pressure: 140 /86 mmHg    Study Type:    TRANSTHORACIC ECHO (TTE) COMPLETE  Diagnosis/ICD: Nonrheumatic aortic (valve) stenosis-I35.0; Essential (primary)                 hypertension-I10  Indication:    History of SVT, Hyperlipidemia, Thrombocytosis  CPT Codes:     Echo Complete w Full [id number]   Study Detail: The following Echo studies were performed: 2D, M-Mode, Doppler and                color flow.        PHYSICIAN INTERPRETATION:  Left Ventricle: The left ventricular systolic function is normal with a visually estimated ejection fraction of 70-75%. There is eccentric left ventricular hypertrophy. There are no regional wall motion abnormalities. The left ventricular cavity size is normal. There is normal posterior left ventricular wall thickness. Spectral Doppler shows a normal pattern of left ventricular diastolic filling. Moderate concentric left-ventricular hypertrophy.  Left Atrium: The left atrial size is normal.  Right Ventricle: The right ventricle is normal in size. There is normal right ventricular global systolic function.  Right Atrium: The right atrial size is normal.  Aortic Valve: The aortic valve is trileaflet. The peak and mean gradients are 61 mmHg and 45 mmHg, respectively,. There is moderate aortic valve cusp calcification. There is mild aortic valve regurgitation. The aortic valve appearance consistent with aortic stenosis. Peak gradient measured at 65 mmHg. Mean grain around 245 mmHg. Calculated aortic valve area is around 0.9 cmï¿½ consistent with close to severe range aortic stenosis.  Mitral Valve: The mitral valve is normal in structure. The doppler estimated peak and mean diastolic gradients are 4 mmHg and 2 mmHg, respectively. There is mild mitral valve regurgitation. The E Vmax is 0.58 m/s.  Tricuspid Valve: The tricuspid valve is structurally normal. There is trace to mild tricuspid regurgitation. The Doppler estimated right ventricular systolic pressure (RVSP) is slightly elevated at 31 mmHg.  Pulmonic Valve: The pulmonic valve is structurally normal. There is no indication of pulmonic valve regurgitation.  Pericardium: No pericardial effusion noted.  Aorta: The aortic root is normal.       CONCLUSIONS:   1. The left ventricular systolic function is normal with a visually estimated ejection fraction of 70-75%.   2. Moderate concentric left-ventricular hypertrophy.   3. There is normal  right ventricular global systolic function.   4. Mild mitral valve regurgitation.   5. The Doppler estimated RVSP is slightly elevated at 31 mmHg.   6. Trace to mild tricuspid regurgitation.   7. The aortic valve appearance consistent with aortic stenosis. Peak gradient measured at 65 mmHg. Mean grain around 245 mmHg. Calculated aortic valve area is around 0.9 cmï¿½ consistent with close to severe range aortic stenosis.   8. There is moderate aortic valve cusp calcification.   9. Mild aortic valve regurgitation.  10. When compared to previous study there is progression of the aortic valve disease.    QUANTITATIVE DATA SUMMARY:     2D MEASUREMENTS:             Normal Ranges:  Ao Root s:       2.90 cm  LAs:             4.30 cm     (2.7-4.0cm)  RVIDd:           3.27 cm     (0.9-3.6cm)  IVSd:            1.93 cm     (0.6-1.1cm)  LVPWd:           0.88 cm     (0.6-1.1cm)  LVIDd:           4.30 cm     (3.9-5.9cm)  LVIDs:           2.37 cm  LV Mass Index:   145.1 g/m2  LVEDV Index:     27.67 ml/m2  LV % FS          44.9 %       LEFT ATRIUM:                 Normal Ranges:  LA Vol A4C:       108.1 ml   (22+/-6mL/m2)  LA Vol A2C:       58.8 ml  LA Vol BP:        83.1 ml  LA Vol Index A4C: 67.0ml/m2  LA Vol Index A2C: 36.4 ml/m2  LA Vol Index BP:  51.5 ml/m2  LA Vol A4C:       94.9 ml  LA Vol A2C:       56.3 ml  LA Vol Index BSA: 46.9 ml/m2       LV SYSTOLIC FUNCTION:                       Normal Ranges:  EF-A4C View:    64 % (>=55%)  EF-A2C View:    54 %  EF-Biplane:     59 %  EF-Visual:      73 %  LV EF Reported: 73 %       LV DIASTOLIC FUNCTION:           Normal Ranges:  MV Peak E:             0.58 m/s  (0.7-1.2 m/s)  MV Peak A:             0.87 m/s  (0.42-0.7 m/s)  E/A Ratio:             0.66      (1.0-2.2)  MV e'                  0.045 m/s (>8.0)  MV lateral e'          0.06 m/s  MV medial e'           0.03 m/s  E/e' Ratio:            12.81     (<8.0)       MITRAL VALVE:          Normal Ranges:  MV Vmax:      1.01 m/s  (<=1.3m/s)  MV peak P.1 mmHg (<5mmHg)  MV mean P.7 mmHg (<48mmHg)  MV VTI:       28.90 cm (10-13cm)  MV DT:        285 msec (150-240msec)       MITRAL INSUFFICIENCY:             Normal Ranges:  MR Vmax:              308.22 cm/s       AORTIC VALVE:            Normal Ranges:  AoV Vmax:      4.03 m/s  (<=1.7m/s)  AoV Peak P.1 mmHg (<20mmHg)  AoV Mean P.9 mmHg (1.7-11.5mmHg)  AoV VTI:       99.78 cm  (18-25cm)  LVOT Diameter: 2.40 cm   (1.8-2.4cm)       AORTIC INSUFFICIENCY:  AI Vmax:       3.94 m/s  AI Half-time:  663 msec  AI Decel Time: 2285 msec  AI Decel Rate: 199.39 cm/s2       RIGHT VENTRICLE:  RV Basal 2.85 cm  RV Mid   2.60 cm  RV Major 5.8 cm  TAPSE:   17.5 mm  RV s'    0.12 m/s       TRICUSPID VALVE/RVSP:          Normal Ranges:  Peak TR Velocity:     2.66 m/s  Est. RA Pressure:     3 mmHg  RV Syst Pressure:     31 mmHg  (< 30mmHg)  IVC Diam:             1.83 cm       PULMONIC VALVE:          Normal Ranges:  RVOT Vmax:      1.00 m/s (0.6-0.9m/s)       AORTA:  Asc Ao Diam 3.68 cm       [id number] Ra Harrell MD  Electronically signed on 2025 at 5:15:49 PM         ** Final **         9:01 AM

## 2025-08-01 NOTE — TELEPHONE ENCOUNTER
----- Message from Ra Harrell sent at 7/30/2025  5:20 PM EDT -----  Advised aortic stenosis is getting worse.  Advised patient to keep her next appointment to discuss  ----- Message -----  From: Amalia Bran - Cardiology Results In  Sent: 7/30/2025   5:15 PM EDT  To: Ra Harrell MD

## 2025-09-11 ENCOUNTER — APPOINTMENT (OUTPATIENT)
Dept: CARDIOLOGY | Facility: CLINIC | Age: OVER 89
End: 2025-09-11
Payer: MEDICARE